# Patient Record
Sex: MALE | Race: WHITE | NOT HISPANIC OR LATINO | URBAN - METROPOLITAN AREA
[De-identification: names, ages, dates, MRNs, and addresses within clinical notes are randomized per-mention and may not be internally consistent; named-entity substitution may affect disease eponyms.]

---

## 2022-01-21 NOTE — PROGRESS NOTES
Clinic Note    Reason for visit:  The primary encounter diagnosis was Gastroesophageal reflux disease, unspecified whether esophagitis present. Diagnoses of Family history of colon cancer, Screening for colon cancer, and BMI 38.0-38.9,adult were also pertinent to this visit.    PCP: Abner Dumont   [address]  / [place] [id number]    HPI:  This is a 66 y.o. male who is here to establish care. Reports a few months ago was having stomach pains in RLQ, but this has resolved. He is taking pantoprazole 40 daily, which he started after excessive belching after eating. Pantoprazole controls his symptoms. Reports daily BMs, no blood in stool.      Previous colonoscopy ~2011, 3 small polyps and told to come back in 10 years per patient.   Father had colon cancer at age 65.     Review of Systems   Constitutional: Negative for chills, diaphoresis, fatigue, fever and unexpected weight change.   HENT: Negative for hearing loss, mouth sores, nosebleeds, postnasal drip, sore throat, trouble swallowing and voice change.    Eyes: Negative for pain, discharge and eye dryness.   Respiratory: Negative for apnea, cough, choking, chest tightness, shortness of breath and wheezing.    Cardiovascular: Negative for chest pain, palpitations, leg swelling and claudication.   Gastrointestinal: Negative for abdominal distention, abdominal pain, anal bleeding, blood in stool, change in bowel habit, constipation, diarrhea, nausea, rectal pain, vomiting, reflux, fecal incontinence and change in bowel habit.   Genitourinary: Negative for bladder incontinence, difficulty urinating, dysuria, flank pain, frequency and hematuria.   Musculoskeletal: Negative for arthralgias, back pain, joint swelling and joint deformity.   Integumentary:  Negative for color change, rash and wound.   Allergic/Immunologic: Negative for environmental allergies and food allergies.   Neurological: Negative for seizures, facial asymmetry, speech difficulty, weakness,  headaches and memory loss.   Hematological: Negative for adenopathy. Does not bruise/bleed easily.   Psychiatric/Behavioral: Negative for agitation, behavioral problems, confusion, hallucinations and sleep disturbance.      Past Medical History:   Diagnosis Date    BMI 38.0-38.9,adult     GERD (gastroesophageal reflux disease)     High cholesterol     Hypertension      Past Surgical History:   Procedure Laterality Date    BACK SURGERY      CERVICAL DISCECTOMY      COLONOSCOPY  2010    HAND SURGERY      TONSILLECTOMY      TOTAL REPLACEMENT OF HIP JOINT USING COMPUTER-ASSISTED NAVIGATION Right      Family History   Problem Relation Age of Onset    Colon cancer Father 65    Heart disease Father      Social History     Tobacco Use    Smoking status: Never Smoker    Smokeless tobacco: Current User     Types: Chew   Substance Use Topics    Alcohol use: Not Currently    Drug use: Not Currently     Review of patient's allergies indicates:   Allergen Reactions    Oxycodone-acetaminophen Itching and Other (See Comments)        Physical Exam:  Vital Signs:  BP (!) 165/82   Pulse 60   Ht 6' (1.829 m)   Wt 129.5 kg (285 lb 9.6 oz)   SpO2 (!) 93%   BMI 38.73 kg/m²   Body mass index is 38.73 kg/m².    Physical Exam  Constitutional:       General: He is not in acute distress.     Appearance: Normal appearance. He is well-developed. He is not ill-appearing or toxic-appearing.   HENT:      Head: Normocephalic and atraumatic.      Nose: Nose normal.      Mouth/Throat:      Mouth: Mucous membranes are moist.      Pharynx: Oropharynx is clear. No oropharyngeal exudate or posterior oropharyngeal erythema.   Eyes:      General: Lids are normal. No scleral icterus.        Right eye: No discharge.         Left eye: No discharge.      Extraocular Movements: Extraocular movements intact.      Conjunctiva/sclera: Conjunctivae normal.   Cardiovascular:      Rate and Rhythm: Normal rate and regular rhythm.      Pulses:            Radial pulses are 2+ on the right side and 2+ on the left side.   Pulmonary:      Effort: Pulmonary effort is normal. No respiratory distress.      Breath sounds: No stridor. No wheezing or rhonchi.   Abdominal:      General: Abdomen is flat. Bowel sounds are normal. There is no distension.      Palpations: Abdomen is soft. There is no fluid wave, hepatomegaly, splenomegaly or mass.      Tenderness: There is no abdominal tenderness. There is no guarding or rebound.   Musculoskeletal:      Cervical back: Full passive range of motion without pain.      Right lower leg: No edema.      Left lower leg: No edema.   Lymphadenopathy:      Cervical: No cervical adenopathy.   Skin:     General: Skin is warm and dry.      Capillary Refill: Capillary refill takes less than 2 seconds.      Coloration: Skin is not cyanotic, jaundiced or pale.      Findings: No rash.   Neurological:      General: No focal deficit present.      Mental Status: He is alert and oriented to person, place, and time.   Psychiatric:         Mood and Affect: Mood normal.         Behavior: Behavior is cooperative.          Labs: Pertinent labs reviewed.       Assessment:  Gastroesophageal reflux disease, unspecified whether esophagitis present  -     Ambulatory Referral to External Surgery    Family history of colon cancer  -     Ambulatory Referral to External Surgery    Screening for colon cancer  -     Ambulatory Referral to External Surgery    BMI 38.0-38.9,adult    Other orders  -     sod sulf-pot chloride-mag sulf (SUTAB) 1.479-0.188- 0.225 gram tablet; Take 12 tablets by mouth once daily. Take according to package instructions with indicated amount of water. No breakfast day before test.  Dispense: 24 tablet; Refill: 0      GERD v esophagitis, rule out BE v HH.     Recommendations:  Schedule upper and lower endoscopies.      No follow-ups on file.    Order summary:  Orders Placed This Encounter   Procedures    Ambulatory Referral to External  Surgery          Adrianne Salinas MD

## 2022-01-24 ENCOUNTER — OFFICE VISIT (OUTPATIENT)
Dept: GASTROENTEROLOGY | Facility: CLINIC | Age: 67
End: 2022-01-24
Payer: COMMERCIAL

## 2022-01-24 VITALS
HEIGHT: 72 IN | DIASTOLIC BLOOD PRESSURE: 82 MMHG | SYSTOLIC BLOOD PRESSURE: 165 MMHG | BODY MASS INDEX: 38.69 KG/M2 | WEIGHT: 285.63 LBS | HEART RATE: 60 BPM | OXYGEN SATURATION: 93 %

## 2022-01-24 DIAGNOSIS — Z12.11 SCREENING FOR COLON CANCER: ICD-10-CM

## 2022-01-24 DIAGNOSIS — Z80.0 FAMILY HISTORY OF COLON CANCER: ICD-10-CM

## 2022-01-24 DIAGNOSIS — K21.9 GASTROESOPHAGEAL REFLUX DISEASE, UNSPECIFIED WHETHER ESOPHAGITIS PRESENT: Primary | ICD-10-CM

## 2022-01-24 PROCEDURE — 1160F PR REVIEW ALL MEDS BY PRESCRIBER/CLIN PHARMACIST DOCUMENTED: ICD-10-PCS | Mod: CPTII,S$GLB,, | Performed by: INTERNAL MEDICINE

## 2022-01-24 PROCEDURE — 1159F MED LIST DOCD IN RCRD: CPT | Mod: CPTII,S$GLB,, | Performed by: INTERNAL MEDICINE

## 2022-01-24 PROCEDURE — 3008F PR BODY MASS INDEX (BMI) DOCUMENTED: ICD-10-PCS | Mod: CPTII,S$GLB,, | Performed by: INTERNAL MEDICINE

## 2022-01-24 PROCEDURE — 3079F PR MOST RECENT DIASTOLIC BLOOD PRESSURE 80-89 MM HG: ICD-10-PCS | Mod: CPTII,S$GLB,, | Performed by: INTERNAL MEDICINE

## 2022-01-24 PROCEDURE — 1160F RVW MEDS BY RX/DR IN RCRD: CPT | Mod: CPTII,S$GLB,, | Performed by: INTERNAL MEDICINE

## 2022-01-24 PROCEDURE — 1159F PR MEDICATION LIST DOCUMENTED IN MEDICAL RECORD: ICD-10-PCS | Mod: CPTII,S$GLB,, | Performed by: INTERNAL MEDICINE

## 2022-01-24 PROCEDURE — 99203 OFFICE O/P NEW LOW 30 MIN: CPT | Mod: S$GLB,,, | Performed by: INTERNAL MEDICINE

## 2022-01-24 PROCEDURE — 3079F DIAST BP 80-89 MM HG: CPT | Mod: CPTII,S$GLB,, | Performed by: INTERNAL MEDICINE

## 2022-01-24 PROCEDURE — 3008F BODY MASS INDEX DOCD: CPT | Mod: CPTII,S$GLB,, | Performed by: INTERNAL MEDICINE

## 2022-01-24 PROCEDURE — 3077F SYST BP >= 140 MM HG: CPT | Mod: CPTII,S$GLB,, | Performed by: INTERNAL MEDICINE

## 2022-01-24 PROCEDURE — 3077F PR MOST RECENT SYSTOLIC BLOOD PRESSURE >= 140 MM HG: ICD-10-PCS | Mod: CPTII,S$GLB,, | Performed by: INTERNAL MEDICINE

## 2022-01-24 PROCEDURE — 99203 PR OFFICE/OUTPT VISIT, NEW, LEVL III, 30-44 MIN: ICD-10-PCS | Mod: S$GLB,,, | Performed by: INTERNAL MEDICINE

## 2022-01-24 RX ORDER — AMLODIPINE BESYLATE AND ATORVASTATIN CALCIUM 10; 40 MG/1; MG/1
TABLET, FILM COATED ORAL
COMMUNITY
Start: 2021-10-29

## 2022-01-24 RX ORDER — LOVASTATIN 40 MG/1
TABLET ORAL
COMMUNITY
Start: 2021-10-29

## 2022-01-24 RX ORDER — MELOXICAM 15 MG/1
TABLET ORAL
COMMUNITY
Start: 2021-10-28

## 2022-01-24 RX ORDER — PANTOPRAZOLE SODIUM 40 MG/1
TABLET, DELAYED RELEASE ORAL
COMMUNITY
Start: 2021-10-29

## 2022-01-24 RX ORDER — SOD SULF/POT CHLORIDE/MAG SULF 1.479 G
12 TABLET ORAL DAILY
Qty: 24 TABLET | Refills: 0 | Status: SHIPPED | OUTPATIENT
Start: 2022-01-24 | End: 2022-01-24 | Stop reason: SDUPTHER

## 2022-01-24 RX ORDER — TAMSULOSIN HYDROCHLORIDE 0.4 MG/1
CAPSULE ORAL
COMMUNITY
Start: 2021-12-10

## 2022-01-24 RX ORDER — SOD SULF/POT CHLORIDE/MAG SULF 1.479 G
12 TABLET ORAL DAILY
Qty: 24 TABLET | Refills: 0 | Status: SHIPPED | OUTPATIENT
Start: 2022-01-24

## 2022-02-14 ENCOUNTER — OUTSIDE PLACE OF SERVICE (OUTPATIENT)
Dept: GASTROENTEROLOGY | Facility: CLINIC | Age: 67
End: 2022-02-14
Payer: COMMERCIAL

## 2022-02-14 LAB
CRC RECOMMENDATION EXT: NORMAL
EGD FOLLOW UP EXTERNAL: NORMAL

## 2022-02-14 PROCEDURE — 43239 PR EGD, FLEX, W/BIOPSY, SGL/MULTI: ICD-10-PCS | Mod: 51,,, | Performed by: INTERNAL MEDICINE

## 2022-02-14 PROCEDURE — 45385 PR COLONOSCOPY,REMV LESN,SNARE: ICD-10-PCS | Mod: 33,,, | Performed by: INTERNAL MEDICINE

## 2022-02-14 PROCEDURE — 45385 COLONOSCOPY W/LESION REMOVAL: CPT | Mod: 33,,, | Performed by: INTERNAL MEDICINE

## 2022-02-14 PROCEDURE — 43239 EGD BIOPSY SINGLE/MULTIPLE: CPT | Mod: 51,,, | Performed by: INTERNAL MEDICINE

## 2022-02-18 ENCOUNTER — TELEPHONE (OUTPATIENT)
Dept: GASTROENTEROLOGY | Facility: CLINIC | Age: 67
End: 2022-02-18
Payer: COMMERCIAL

## 2022-02-18 NOTE — TELEPHONE ENCOUNTER
GBx react w/o Hp, GEBx reflux, 2 TA, repeat colon in 3y.  CMA to notify patient. No infection, BE or precancerous cells on his upper endoscopy Bx. He may try decreasing his panto to 40 QOD or we can send 20 daily. His colon polyps were precancerous but benign, repeat colonoscopy in 3 years. Make 6 month follow up OV. Call sooner if any issues.  NBP

## 2022-02-22 ENCOUNTER — TELEPHONE (OUTPATIENT)
Dept: GASTROENTEROLOGY | Facility: CLINIC | Age: 67
End: 2022-02-22
Payer: COMMERCIAL

## 2022-02-22 NOTE — TELEPHONE ENCOUNTER
Results/recommendations dw pt who voices understanding/agreement. He will attempt to go down on panto to 40 bid.  KDL, CMA

## 2023-01-19 ENCOUNTER — DOCUMENTATION ONLY (OUTPATIENT)
Dept: GASTROENTEROLOGY | Facility: CLINIC | Age: 68
End: 2023-01-19
Payer: COMMERCIAL

## 2025-06-12 ENCOUNTER — OFFICE VISIT (OUTPATIENT)
Dept: CARDIOTHORACIC SURGERY | Facility: CLINIC | Age: 70
End: 2025-06-12
Payer: COMMERCIAL

## 2025-06-12 VITALS
OXYGEN SATURATION: 95 % | HEIGHT: 72 IN | RESPIRATION RATE: 18 BRPM | WEIGHT: 254.13 LBS | DIASTOLIC BLOOD PRESSURE: 86 MMHG | BODY MASS INDEX: 34.42 KG/M2 | HEART RATE: 62 BPM | SYSTOLIC BLOOD PRESSURE: 147 MMHG

## 2025-06-12 DIAGNOSIS — I10 ESSENTIAL HYPERTENSION, BENIGN: ICD-10-CM

## 2025-06-12 DIAGNOSIS — I35.0 NONRHEUMATIC AORTIC VALVE STENOSIS: Primary | ICD-10-CM

## 2025-06-12 DIAGNOSIS — E66.9 OBESITY, UNSPECIFIED CLASS, UNSPECIFIED OBESITY TYPE, UNSPECIFIED WHETHER SERIOUS COMORBIDITY PRESENT: ICD-10-CM

## 2025-06-12 RX ORDER — NAPROXEN SODIUM 220 MG/1
81 TABLET, FILM COATED ORAL
COMMUNITY

## 2025-06-12 RX ORDER — VALSARTAN 320 MG/1
320 TABLET ORAL
COMMUNITY

## 2025-06-12 RX ORDER — AMLODIPINE BESYLATE 10 MG/1
10 TABLET ORAL
COMMUNITY

## 2025-06-12 RX ORDER — TESTOSTERONE 20.25 MG/1.25G
GEL TOPICAL
COMMUNITY

## 2025-06-12 RX ORDER — SILDENAFIL 100 MG/1
100 TABLET, FILM COATED ORAL DAILY PRN
COMMUNITY

## 2025-06-12 RX ORDER — ROSUVASTATIN CALCIUM 10 MG/1
10 TABLET, COATED ORAL NIGHTLY
COMMUNITY

## 2025-06-14 NOTE — PROGRESS NOTES
Subjective:      Patient ID: John Judge is a 69 y.o. male who presents for evaluation of AS    Chief Complaint: New Pt and TAVR consult    HPI 69-year-old male presents with past medical history significant for hypertension, hyperlipidemia, cervical disc surgery, lumbar spine surgery, aortic stenosis, obesity, testosterone deficiency, osteoarthritis presents For surgical evaluation of Aortic Stenosis.  Patient is referred by her cardiologist Dr. Neely/ [facility].  Symptoms have included exertional angina with the pain in the mid chest.  He denies any radiation, diaphoresis, or associated nausea.  He denies any orthopnea or lower extremity edema..  He reports having a scheduled root canal next week.  STs 0.819%     Left heart catheterization 06/10/2025 no significant obstructive disease.    TTE 03/12/2025, LV ID 5.6, ejection fraction 63%, grade 1 diastolic dysfunction, LV outflow track obstructed detected with Valsalva.  Peak LVOT gradient was 39.56, aortic valve area index 0.6 centimeters squared/m2 mean aortic valve gradient 35 mm Hg, mild AI, aortic insufficiency pressure half-time 383 MS, mild MR, RVSP 34 mm Hg    Twelve lead EKG 05/29/2025 sinus bradycardia rate 53     CCTA 04/17/2025 nonobstructive three-vessel coronary disease probable moderate aortic valve stenosis.  Calcium score 1380    CTA abdomen/pelvis 05/29/2025 no anatomic contraindications to TAVR, very mild aortoiliac arthrosclerosis    Pulmonary function test 05/29/2025 FEV 1 3.58 or 105% predicted, DLCO 28.21 or 104% predicted.    Carotid ultrasound 05/29/2025 no hemodynamically significant carotid stenosis    Valve planning, suggest 26 mm S3 with 14% over sizing via transfemoral approach.  Review of Systems   Constitutional: Negative for chills and fever.   HENT:  Negative for congestion, hearing loss and sore throat.    Eyes:  Negative for blurred vision and double vision.   Cardiovascular:  Positive for chest pain. Negative for  dyspnea on exertion, leg swelling and orthopnea.   Respiratory:  Negative for cough and shortness of breath.    Skin:  Negative for dry skin and poor wound healing.   Musculoskeletal:  Negative for back pain and joint pain.   Gastrointestinal:  Negative for abdominal pain, constipation and diarrhea.   Genitourinary:  Negative for dysuria and urgency.   Neurological:  Negative for dizziness, focal weakness and headaches.   Psychiatric/Behavioral:  Negative for depression. The patient does not have insomnia.       Past Medical History:   Diagnosis Date    BMI 38.0-38.9,adult     GERD (gastroesophageal reflux disease)     High cholesterol     Hypertension       Past Surgical History:   Procedure Laterality Date    BACK SURGERY      CERVICAL DISCECTOMY      COLONOSCOPY  2010    HAND SURGERY      TONSILLECTOMY      TOTAL REPLACEMENT OF HIP JOINT USING COMPUTER-ASSISTED NAVIGATION Right       Family History   Problem Relation Name Age of Onset    Colon cancer Father  65    Heart disease Father        Social History     Socioeconomic History    Marital status: Unknown    Number of children: 3   Occupational History    Occupation: Disabled   Tobacco Use    Smoking status: Never    Smokeless tobacco: Current     Types: Chew   Substance and Sexual Activity    Alcohol use: Not Currently    Drug use: Not Currently        Medication List with Changes/Refills   Current Medications    AMLODIPINE (NORVASC) 10 MG TABLET    Take 10 mg by mouth.    AMLODIPINE-ATORVASTATIN (CADUET) 10-40 MG PER TABLET        ASPIRIN 81 MG CHEW    Take 81 mg by mouth.    LOVASTATIN (MEVACOR) 40 MG TABLET        MELOXICAM (MOBIC) 15 MG TABLET        PANTOPRAZOLE (PROTONIX) 40 MG TABLET        ROSUVASTATIN (CRESTOR) 10 MG TABLET    Take 10 mg by mouth every evening.    SILDENAFIL (VIAGRA) 100 MG TABLET    Take 100 mg by mouth daily as needed.    SOD SULF-POT CHLORIDE-MAG SULF (SUTAB) 1.479-0.188- 0.225 GRAM TABLET    Take 12 tablets by  mouth once daily. Take according to package instructions with indicated amount of water. No breakfast day before test.    TAMSULOSIN (FLOMAX) 0.4 MG CAP        TESTOSTERONE (ANDROGEL) 1.62 % (20.25 MG/1.25 GRAM) GLPK    1 packet to skin in the morning to shoulder and upper arms Transdermal Once a day    VALSARTAN (DIOVAN) 320 MG TABLET    Take 320 mg by mouth.        Objective:     BP (!) 147/86 (BP Location: Left arm, Patient Position: Sitting)   Pulse 62   Resp 18   Ht 6' (1.829 m)   Wt 115.3 kg (254 lb 1.6 oz)   SpO2 95%   BMI 34.46 kg/m²     Physical Exam  HENT:      Head: Normocephalic.      Nose: Nose normal.      Mouth/Throat:      Mouth: Mucous membranes are dry.   Eyes:      Pupils: Pupils are equal, round, and reactive to light.   Cardiovascular:      Rate and Rhythm: Normal rate and regular rhythm.      Pulses: Normal pulses.      Heart sounds: Murmur heard.   Pulmonary:      Effort: Pulmonary effort is normal.   Abdominal:      General: Abdomen is flat.      Palpations: Abdomen is soft.   Musculoskeletal:         General: Normal range of motion.      Cervical back: Normal range of motion.   Skin:     General: Skin is warm and dry.      Capillary Refill: Capillary refill takes less than 2 seconds.   Neurological:      Mental Status: He is alert and oriented to person, place, and time.   Psychiatric:         Mood and Affect: Mood normal.              Assessment & Plan:   Severe Symptomatic AS per Lara  Hypertension/ hyperlipidemia  cervical disc surgery/ lumbar spine surgery  Obesity  testosterone deficiency  osteoarthritis presents      6/14/25 Seen and examined with Dr. Lay- STS score 0.8.  Patient needing dental work next week once cleared we will proceed with TAVR.  Long discussion SAVR versus TAVR with risks versus benefits.  Patient elects TAVR with the understanding that he may need another intervention in his lifetime.  He is a full rescue and patient wishes to proceed    David Reyes  FNP-C  Cardiothoracic Surgery Nurse Practitioner

## 2025-07-01 ENCOUNTER — OUTSIDE PLACE OF SERVICE (OUTPATIENT)
Dept: CARDIOTHORACIC SURGERY | Facility: CLINIC | Age: 70
End: 2025-07-01
Payer: COMMERCIAL